# Patient Record
Sex: FEMALE | Race: BLACK OR AFRICAN AMERICAN | URBAN - METROPOLITAN AREA
[De-identification: names, ages, dates, MRNs, and addresses within clinical notes are randomized per-mention and may not be internally consistent; named-entity substitution may affect disease eponyms.]

---

## 2022-01-11 ENCOUNTER — TELEPHONE ENCOUNTER (OUTPATIENT)
Dept: URBAN - METROPOLITAN AREA CLINIC 92 | Facility: CLINIC | Age: 32
End: 2022-01-11

## 2022-01-26 ENCOUNTER — OFFICE VISIT (OUTPATIENT)
Dept: URBAN - METROPOLITAN AREA CLINIC 92 | Facility: CLINIC | Age: 32
End: 2022-01-26

## 2022-01-31 ENCOUNTER — WEB ENCOUNTER (OUTPATIENT)
Dept: URBAN - METROPOLITAN AREA CLINIC 92 | Facility: CLINIC | Age: 32
End: 2022-01-31

## 2022-01-31 ENCOUNTER — OFFICE VISIT (OUTPATIENT)
Dept: URBAN - METROPOLITAN AREA CLINIC 92 | Facility: CLINIC | Age: 32
End: 2022-01-31
Payer: COMMERCIAL

## 2022-01-31 VITALS
HEART RATE: 86 BPM | SYSTOLIC BLOOD PRESSURE: 121 MMHG | BODY MASS INDEX: 29.99 KG/M2 | TEMPERATURE: 96.1 F | HEIGHT: 65 IN | WEIGHT: 180 LBS | DIASTOLIC BLOOD PRESSURE: 74 MMHG

## 2022-01-31 DIAGNOSIS — R10.9 ABDOMINAL PAIN: ICD-10-CM

## 2022-01-31 DIAGNOSIS — K59.01 CONSTIPATION: ICD-10-CM

## 2022-01-31 DIAGNOSIS — R19.7 DIARRHEA: ICD-10-CM

## 2022-01-31 DIAGNOSIS — R11.2 N&V (NAUSEA AND VOMITING): ICD-10-CM

## 2022-01-31 DIAGNOSIS — K58.9 IBS (IRRITABLE BOWEL SYNDROME): ICD-10-CM

## 2022-01-31 PROCEDURE — 99204 OFFICE O/P NEW MOD 45 MIN: CPT | Performed by: INTERNAL MEDICINE

## 2022-01-31 RX ORDER — SACCHAROMYCES BOULARDII 50 MG
2 CAPSULE CAPSULE ORAL TWICE A DAY
Qty: 120 CAPSULE | Refills: 11 | OUTPATIENT
Start: 2022-01-31 | End: 2023-01-26

## 2022-01-31 RX ORDER — HYOSCYAMINE SULFATE 0.12 MG/1
1 TABLET UNDER THE TONGUE AND ALLOW TO DISSOLVE  AS NEEDED TABLET, ORALLY DISINTEGRATING ORAL THREE TIMES A DAY
Qty: 90 | Refills: 11 | OUTPATIENT
Start: 2022-01-31 | End: 2023-01-26

## 2022-01-31 NOTE — HPI-TODAY'S VISIT:
new pt for an evaluation of  abd pain N/V and diarrhea alt w constip  abd pain Location BUQ Quality achy crampy Frequency daily Duration 3 mo Radiation BLQ Severity = severe Exacerbating factors eating Relieving factors BM   Assoc with N/V and diarrhea alt w constip   Denies hematochezia melena jaundice unintentional wt loss   Denies dyspepsia htbrn dysphagia odynophagia food impaction CP cough anorexia light headedness   Denies scleral icterus, increased abd girth, LE edema, confusion, disorientation, memory loss, hematemesis   delta

## 2022-02-21 ENCOUNTER — DASHBOARD ENCOUNTERS (OUTPATIENT)
Age: 32
End: 2022-02-21

## 2022-02-21 PROBLEM — 10743008 IRRITABLE BOWEL SYNDROME: Status: ACTIVE | Noted: 2022-01-31

## 2022-02-21 PROBLEM — 14760008 CONSTIPATION: Status: ACTIVE | Noted: 2022-01-31

## 2022-02-23 ENCOUNTER — ERX REFILL RESPONSE (OUTPATIENT)
Dept: URBAN - METROPOLITAN AREA CLINIC 92 | Facility: CLINIC | Age: 32
End: 2022-02-23

## 2022-02-23 RX ORDER — HYOSCYAMINE SULFATE 0.12 MG/1
PLACE 1 TABLET UNDER THE TONGUE AND ALLOW TO DISSOLVE AS NEEDED SUBLINGUAL THREE TIMES A DAY 30 DAYS TABLET ORAL; SUBLINGUAL
Qty: 90 TABLET | Refills: 12 | OUTPATIENT

## 2022-02-23 RX ORDER — HYOSCYAMINE SULFATE 0.12 MG/1
1 TABLET UNDER THE TONGUE AND ALLOW TO DISSOLVE  AS NEEDED TABLET, ORALLY DISINTEGRATING ORAL THREE TIMES A DAY
Qty: 90 | Refills: 11 | OUTPATIENT

## 2022-02-25 ENCOUNTER — OFFICE VISIT (OUTPATIENT)
Dept: URBAN - METROPOLITAN AREA TELEHEALTH 2 | Facility: TELEHEALTH | Age: 32
End: 2022-02-25

## 2022-02-25 RX ORDER — SACCHAROMYCES BOULARDII 50 MG
2 CAPSULE CAPSULE ORAL TWICE A DAY
Qty: 120 CAPSULE | Refills: 11 | COMMUNITY
Start: 2022-01-31 | End: 2023-01-26

## 2022-02-25 RX ORDER — SACCHAROMYCES BOULARDII 50 MG
2 CAPSULE CAPSULE ORAL TWICE A DAY
Qty: 120 CAPSULE | Refills: 11 | OUTPATIENT

## 2022-02-25 RX ORDER — HYOSCYAMINE SULFATE 0.12 MG/1
1 TABLET UNDER THE TONGUE AND ALLOW TO DISSOLVE  AS NEEDED TABLET, ORALLY DISINTEGRATING ORAL THREE TIMES A DAY
Qty: 90 | Refills: 11 | COMMUNITY
Start: 2022-01-31 | End: 2023-01-26

## 2022-02-25 RX ORDER — HYOSCYAMINE SULFATE 0.12 MG/1
1 TABLET UNDER THE TONGUE AND ALLOW TO DISSOLVE  AS NEEDED TABLET, ORALLY DISINTEGRATING ORAL THREE TIMES A DAY
Qty: 90 | Refills: 11 | OUTPATIENT

## 2022-02-25 NOTE — HPI-TODAY'S VISIT:
wt __# over 1mo (was 180)  On Florastor and Levsin,  abd pain Location BUQ Quality achy crampy Frequency daily Duration 3 mo Radiation BLQ Severity = severe Exacerbating factors eating Relieving factors BM   Assoc with N/V and diarrhea alt w constip   Denies hematochezia melena jaundice unintentional wt loss   Denies dyspepsia htbrn dysphagia odynophagia food impaction CP cough anorexia light headedness   Denies scleral icterus, increased abd girth, LE edema, confusion, disorientation, memory loss, hematemesis   delta